# Patient Record
Sex: MALE | Race: WHITE | Employment: FULL TIME | URBAN - METROPOLITAN AREA
[De-identification: names, ages, dates, MRNs, and addresses within clinical notes are randomized per-mention and may not be internally consistent; named-entity substitution may affect disease eponyms.]

---

## 2019-03-05 ENCOUNTER — HOSPITAL LABORATORY (OUTPATIENT)
Dept: OTHER | Facility: CLINIC | Age: 69
End: 2019-03-05

## 2019-03-08 LAB
BACTERIA SPEC CULT: NORMAL
Lab: NORMAL
SPECIMEN SOURCE: NORMAL

## 2019-11-06 ENCOUNTER — HEALTH MAINTENANCE LETTER (OUTPATIENT)
Age: 69
End: 2019-11-06

## 2020-02-16 ENCOUNTER — HEALTH MAINTENANCE LETTER (OUTPATIENT)
Age: 70
End: 2020-02-16

## 2020-11-29 ENCOUNTER — HEALTH MAINTENANCE LETTER (OUTPATIENT)
Age: 70
End: 2020-11-29

## 2021-04-10 ENCOUNTER — HEALTH MAINTENANCE LETTER (OUTPATIENT)
Age: 71
End: 2021-04-10

## 2021-05-30 ENCOUNTER — HEALTH MAINTENANCE LETTER (OUTPATIENT)
Age: 71
End: 2021-05-30

## 2021-08-31 ENCOUNTER — HOSPITAL ENCOUNTER (EMERGENCY)
Facility: CLINIC | Age: 71
Discharge: HOME | End: 2021-08-31
Attending: PHYSICIAN ASSISTANT | Admitting: PHYSICIAN ASSISTANT
Payer: COMMERCIAL

## 2021-08-31 VITALS
SYSTOLIC BLOOD PRESSURE: 132 MMHG | BODY MASS INDEX: 33.18 KG/M2 | OXYGEN SATURATION: 95 % | HEIGHT: 72 IN | TEMPERATURE: 97.9 F | DIASTOLIC BLOOD PRESSURE: 84 MMHG | HEART RATE: 61 BPM | RESPIRATION RATE: 16 BRPM | WEIGHT: 245 LBS

## 2021-08-31 DIAGNOSIS — T63.481A INSECT STING: ICD-10-CM

## 2021-08-31 PROCEDURE — 99283 EMERGENCY DEPT VISIT LOW MDM: CPT

## 2021-08-31 RX ORDER — EPINEPHRINE 0.3 MG/.3ML
0.3 INJECTION SUBCUTANEOUS
Qty: 2 EACH | Refills: 0 | Status: SHIPPED | OUTPATIENT
Start: 2021-08-31

## 2021-08-31 ASSESSMENT — ENCOUNTER SYMPTOMS
TREMORS: 1
SHORTNESS OF BREATH: 0
TROUBLE SWALLOWING: 0
LIGHT-HEADEDNESS: 1
CHEST TIGHTNESS: 0
FACIAL SWELLING: 0

## 2021-08-31 ASSESSMENT — MIFFLIN-ST. JEOR: SCORE: 1914.31

## 2021-08-31 NOTE — ED TRIAGE NOTES
Pt was stung by a bee today - he was given epi IM - told to com e to ed for evaluation post epi .... pt stated he feels dizzy and shakey

## 2021-09-01 NOTE — ED PROVIDER NOTES
History   Chief Complaint:  Insect Bite and epi admin watch (sent here after getting epi at [facility] urgent care - mtold he needed to be observed for 4 hours)     The history is provided by the patient.      Erik Quick is a 70 year old male with history of anaphylaxis following a bee sting, type II diabetes, and asthma who presents with concerns following a bee sting and epinephrine administration. Erik was stung by a bee around 5:00 PM and noticed redness around the sting site as well as a stinging feeling in his arm. He went to [facility] urgent care and was given epinephrine there.  He had no respiratory symptoms or airway concerns at the time of administration.  Following the epi administration, he was told to come to the ED for observation. He denies facial swelling, throat swelling, body rashes, and chest tightness following the bee sting. After he had received the dose of epinephrine, he reports that he felt lightheaded and shaky, but this is now resolved.     Review of Systems   HENT: Negative for facial swelling and trouble swallowing.    Respiratory: Negative for chest tightness and shortness of breath.    Skin: Negative for rash.   Neurological: Positive for tremors (from the epi) and light-headedness (from the epi).   All other systems reviewed and are negative.    Allergies:  Bees  Codeine Sulfate  Dust Mites  Grass  Trees    Medications:  Albuterol inhaler  Aspirin 81 mg  Metformin  Naprosyn  Prilosec  Zocor  Flomax  EpiPen  Breo Ellipta  Toprol XL  Microlet lancet  Jardiance  Lipitor    Past Medical History:    Type II diabetes  Anaphylactic shock following bee sting  Hyperlipidemia  Asthma  Prostatitis  Closed metatarsal fracture  Dermatitis/Eczema  Vitamin D deficiency  Chest pain  Cholelithiasis  Gout  Fracture of navicular bone of left wrist    Past Surgical History:    Septoplasty  Navicular fx bone graft  Tonsillectomy and adenoidectomy  Diagnostic colonoscopy  (x2)  EGD  Cholecystectomy    Family History:    Brother: colon polyps  Father: prostate cancer, heart disease  Mother: colon cancer, diabetes, COPD, dementia    Social History:  PCP: Erik Lee  Presents alone   No history of smoking  Rare alcohol use    Physical Exam     Patient Vitals for the past 24 hrs:   BP Temp Temp src Pulse Resp SpO2 Height Weight   08/31/21 2144 132/84 -- -- 61 -- 95 % -- --   08/31/21 2100 -- -- -- -- -- 100 % -- --   08/31/21 1855 129/73 97.9  F (36.6  C) Temporal 67 16 99 % 1.829 m (6') 111.1 kg (245 lb)       Physical Exam  General: Resting comfortably.  Alert and oriented.   Head:  The scalp, face, and head appear normal   Eyes:  Conjunctivae and sclerae are normal    ENT:    The oropharynx is normal    Uvula is in the midline     No lip or tongue swelling.  No airway compromise.  No oral swelling noted.   CV:  Regular rate and rhythm     Normal S1/S2  Resp:  Lungs are clear to auscultation    Non-labored    No rales or wheezing   MS:  Normal muscular tone   Skin:  No rash or acute skin lesions noted   Neuro: Speech is normal and fluent.     Emergency Department Course     Emergency Department Course:    Reviewed:  I reviewed nursing notes, vitals, past medical history and care everywhere    Assessments:  2007 I obtained history and examined the patient as noted above.   2137 I rechecked the patient and explained findings.     Disposition:  The patient was discharged to home.     Impression & Plan     Medical Decision Making:  Erik Quick is a 70 year old male who presents for observation after epinephrine shot.  He was seen at [facility] urgent care after a bee sting.  They administered epinephrine and recommended he come here for a 3 to 4-hour observation following this medication.  Patient states that his only symptoms after the bee sting was mild surrounding erythema to the site, but he absolutely denies any respiratory or airway symptoms.  In addition, they also  gave him Benadryl.  On exam, the patient is well-appearing.  There are no signs of oral involvement or oral swelling.  There is no obvious ongoing rash.  The area of the from this state has improved as well.  No indication for further administration of medication.  Patient was observed for until 3.5 hours after epinephrine without any rebound symptoms or any indication for further medications at this time.  No indication for hospitalization.  EpiPen prescription was supplied.  Overall, I believe the patient is safe to discharge home.  Follow-up with primary care doctor as needed.  Red flag symptoms, and reasons for return were discussed and understood.  All questions were answered prior to discharge.  The patient understands and agrees to this plan.    Covid-19  Erik Quick was evaluated during a global COVID-19 pandemic, which necessitated consideration that the patient might be at risk for infection with the SARS-CoV-2 virus that causes COVID-19.   Applicable protocols for evaluation were followed during the patient's care. COVID-19 was considered as part of the patient's evaluation.     Diagnosis:    ICD-10-CM    1. Insect sting  T63.481A      Discharge Medications:  Discharge Medication List as of 8/31/2021  9:42 PM      START taking these medications    Details   EPINEPHrine (ANY BX GENERIC EQUIV) 0.3 MG/0.3ML injection 2-pack Inject 0.3 mLs (0.3 mg) into the muscle once as needed for anaphylaxis, Disp-2 each, R-0, E-Prescribe           Scribe Disclosure:  Laurie LOPES, am serving as a scribe at 8:07 PM on 8/31/2021 to document services personally performed by Stefanie Apple PA based on my observations and the provider's statements to me.            Stefanie Apple PA-C  08/31/21 2248

## 2021-09-19 ENCOUNTER — HEALTH MAINTENANCE LETTER (OUTPATIENT)
Age: 71
End: 2021-09-19

## 2022-01-09 ENCOUNTER — HEALTH MAINTENANCE LETTER (OUTPATIENT)
Age: 72
End: 2022-01-09

## 2022-05-01 ENCOUNTER — HEALTH MAINTENANCE LETTER (OUTPATIENT)
Age: 72
End: 2022-05-01

## 2022-08-21 ENCOUNTER — HEALTH MAINTENANCE LETTER (OUTPATIENT)
Age: 72
End: 2022-08-21

## 2022-11-21 ENCOUNTER — HEALTH MAINTENANCE LETTER (OUTPATIENT)
Age: 72
End: 2022-11-21

## 2023-04-16 ENCOUNTER — HEALTH MAINTENANCE LETTER (OUTPATIENT)
Age: 73
End: 2023-04-16

## 2023-06-02 ENCOUNTER — HEALTH MAINTENANCE LETTER (OUTPATIENT)
Age: 73
End: 2023-06-02

## 2023-11-26 ENCOUNTER — HEALTH MAINTENANCE LETTER (OUTPATIENT)
Age: 73
End: 2023-11-26

## 2024-06-23 ENCOUNTER — HEALTH MAINTENANCE LETTER (OUTPATIENT)
Age: 74
End: 2024-06-23